# Patient Record
(demographics unavailable — no encounter records)

---

## 2025-03-07 NOTE — PHYSICAL EXAM
[No Acute Distress] : no acute distress [Well Nourished] : well nourished [Well-Appearing] : well-appearing [Normal Sclera/Conjunctiva] : normal sclera/conjunctiva [PERRL] : pupils equal round and reactive to light [Normal Outer Ear/Nose] : the outer ears and nose were normal in appearance [Normal Oropharynx] : the oropharynx was normal [No Lymphadenopathy] : no lymphadenopathy [Supple] : supple [No Respiratory Distress] : no respiratory distress  [No Accessory Muscle Use] : no accessory muscle use [Clear to Auscultation] : lungs were clear to auscultation bilaterally [Normal Rate] : normal rate  [Regular Rhythm] : with a regular rhythm [Normal S1, S2] : normal S1 and S2 [Soft] : abdomen soft [Non Tender] : non-tender [Normal Bowel Sounds] : normal bowel sounds [No Joint Swelling] : no joint swelling [Grossly Normal Strength/Tone] : grossly normal strength/tone [No Rash] : no rash [Coordination Grossly Intact] : coordination grossly intact [No Focal Deficits] : no focal deficits [Normal Gait] : normal gait [Deep Tendon Reflexes (DTR)] : deep tendon reflexes were 2+ and symmetric [Speech Grossly Normal] : speech grossly normal [Normal Mood] : the mood was normal

## 2025-03-07 NOTE — HISTORY OF PRESENT ILLNESS
[FreeTextEntry1] : physical [de-identified] : MS. MILTON IS A PLEASANT 39 YO PRESENTING FOR YEARLY PHYSICAL.  IS DUE TO SEE GYN.  SAW BREAST SURGERY AND HAD BENIGN BREAST BIOPSY.  IS ALSO HERE FOR CHORNIC CARE FOLLOW-UP.  NOTES THAT SHE WENT TO [facility] ER LAST WEEK.  WASN'T FEELING WELL AND HAD EVALUATION INCLUDING EKG AND LAB WORK.  WAS DIAGNOSED WITH HYPERGLYCEMIA.  HAS APPOINTMENT WITH NEW ENDOCRINOLOGIST NEXT WEDNESDAY.  HASN'T BEEN FOLLOWING UP WITH PRIOR ENDOCRINOLOGIST DR. AHUMADA OR TAKING DIABETES MEDICATION.  ALSO NOT TAKING LIPITOR FOR CHORNIC.  RAN OUT OF DIABETES MEDICATION IN JANUARY.  PRESCRIBED INSULIN TO TAKE BY ER.  DUE FOR DIABETIC EYE EXAM.  DIET HAS GOTTEN BETTER BUT NOT ROUTINELY EXERCISING. HISTORY OF LOW B12 AND VITAMIN D.  NOT ON SUPPLEMENTS. LMP: 2 WEEKS AGO BREAST SURGERY: DR. KELLY GI: DR. VALDEZ ENDOCRINOLOGY: DR. AHUMADA GYN: REQUESTING NAME OF NEW PROVIDER

## 2025-03-07 NOTE — HEALTH RISK ASSESSMENT
[Little interest or pleasure doing things] : 1) Little interest or pleasure doing things [Feeling down, depressed, or hopeless] : 2) Feeling down, depressed, or hopeless [0] : 2) Feeling down, depressed, or hopeless: Not at all (0) [PHQ-2 Negative - No further assessment needed] : PHQ-2 Negative - No further assessment needed [Good] : ~his/her~  mood as  good [Intercurrent ED visits] : went to ED [No] : In the past 12 months have you used drugs other than those required for medical reasons? No [Never] : Never [NO] : No [HIV Test offered] : HIV Test offered [Hepatitis C test offered] : Hepatitis C test offered [None] : None [With Family] : lives with family [# of Members in Household ___] :  household currently consist of [unfilled] member(s) [Employed] : employed [Graduate School] : graduate school [# Of Children ___] : has [unfilled] children [Feels Safe at Home] : Feels safe at home [Reports normal functional visual acuity (ie: able to read med bottle)] : Reports normal functional visual acuity [Smoke Detector] : smoke detector [Carbon Monoxide Detector] : carbon monoxide detector [Safety elements used in home] : safety elements used in home [Seat Belt] :  uses seat belt [Sunscreen] : uses sunscreen [de-identified] : SEE HPI [de-identified] : NOT ROUTINELY EXERCISING [de-identified] : DIET HAS "GOTTEN BETTER".  EATING HOME MORE [[id number]] : 0 [Change in mental status noted] : No change in mental status noted [Sexually Active] : not sexually active [High Risk Behavior] : no high risk behavior [Reports changes in hearing] : Reports no changes in hearing [Reports changes in vision] : Reports no changes in vision [Reports changes in dental health] : Reports no changes in dental health [Travel to Developing Areas] : does not  travel to developing areas [TB Exposure] : is not being exposed to tuberculosis [Caregiver Concerns] : does not have caregiver concerns [MammogramDate] : 11/24 [MammogramComments] : [facility] [PapSmearDate] : 2017 [de-identified] : GLASSES FOR DISTANCE

## 2025-03-07 NOTE — PLAN
[FreeTextEntry1] : GYN; CONTACT INFORMATION PROVIDED VISION SCREENING SAFETY / HEALTHY HABITS REVIEWED HEALTHY DIET AND LIFESTYLE MODIFICATIONS VACCINATIONS DISCUSSED: COVID, FLU, PNEUMONIA CHECK ROUTINE LAB WORK FOLLOW-UP ALL SPECIALISTS AS DIRECTED  COUNSELLED ON HEALTHY DIET AND LIFESTYLE MODIFICATIONS HEALTHY WEIGHT LOSS  OPHTHALMOLOGY FOR DIABETIC EYE EXAM TO TAKE MEDICATOIN PRESCRIBED BY ER KEEP UPCOMING APPOINTMENT WITH ENDOCRINOLOGY WILL GET UPDATED LAB WORK INCLUDING LIPIDS - FURTHER RECOMMENDATIONS PENDING RESULTS; NOT TAKING STATIN PREVIOUSLY PRESCRIBED WILL GET UPDATED B12 AND VITAMIN D LEVELS  CALL WITH ANY QUESTIONS, CONCERNS OR CHANGES

## 2025-03-07 NOTE — HISTORY OF PRESENT ILLNESS
[FreeTextEntry1] : physical [de-identified] : MS. MILTON IS A PLEASANT 39 YO PRESENTING FOR YEARLY PHYSICAL.  IS DUE TO SEE GYN.  SAW BREAST SURGERY AND HAD BENIGN BREAST BIOPSY.  IS ALSO HERE FOR CHORNIC CARE FOLLOW-UP.  NOTES THAT SHE WENT TO [facility] ER LAST WEEK.  WASN'T FEELING WELL AND HAD EVALUATION INCLUDING EKG AND LAB WORK.  WAS DIAGNOSED WITH HYPERGLYCEMIA.  HAS APPOINTMENT WITH NEW ENDOCRINOLOGIST NEXT WEDNESDAY.  HASN'T BEEN FOLLOWING UP WITH PRIOR ENDOCRINOLOGIST DR. AHUMADA OR TAKING DIABETES MEDICATION.  ALSO NOT TAKING LIPITOR FOR CHORNIC.  RAN OUT OF DIABETES MEDICATION IN JANUARY.  PRESCRIBED INSULIN TO TAKE BY ER.  DUE FOR DIABETIC EYE EXAM.  DIET HAS GOTTEN BETTER BUT NOT ROUTINELY EXERCISING. HISTORY OF LOW B12 AND VITAMIN D.  NOT ON SUPPLEMENTS. LMP: 2 WEEKS AGO BREAST SURGERY: DR. KELLY GI: DR. VALDEZ ENDOCRINOLOGY: DR. AHUMADA GYN: REQUESTING NAME OF NEW PROVIDER

## 2025-03-07 NOTE — HEALTH RISK ASSESSMENT
[Little interest or pleasure doing things] : 1) Little interest or pleasure doing things [Feeling down, depressed, or hopeless] : 2) Feeling down, depressed, or hopeless [0] : 2) Feeling down, depressed, or hopeless: Not at all (0) [PHQ-2 Negative - No further assessment needed] : PHQ-2 Negative - No further assessment needed [Good] : ~his/her~  mood as  good [Intercurrent ED visits] : went to ED [No] : In the past 12 months have you used drugs other than those required for medical reasons? No [Never] : Never [NO] : No [HIV Test offered] : HIV Test offered [Hepatitis C test offered] : Hepatitis C test offered [None] : None [With Family] : lives with family [# of Members in Household ___] :  household currently consist of [unfilled] member(s) [Employed] : employed [Graduate School] : graduate school [# Of Children ___] : has [unfilled] children [Feels Safe at Home] : Feels safe at home [Reports normal functional visual acuity (ie: able to read med bottle)] : Reports normal functional visual acuity [Smoke Detector] : smoke detector [Carbon Monoxide Detector] : carbon monoxide detector [Safety elements used in home] : safety elements used in home [Seat Belt] :  uses seat belt [Sunscreen] : uses sunscreen [de-identified] : SEE HPI [de-identified] : NOT ROUTINELY EXERCISING [de-identified] : DIET HAS "GOTTEN BETTER".  EATING HOME MORE [[id number]] : 0 [Change in mental status noted] : No change in mental status noted [Sexually Active] : not sexually active [High Risk Behavior] : no high risk behavior [Reports changes in hearing] : Reports no changes in hearing [Reports changes in vision] : Reports no changes in vision [Reports changes in dental health] : Reports no changes in dental health [Travel to Developing Areas] : does not  travel to developing areas [TB Exposure] : is not being exposed to tuberculosis [Caregiver Concerns] : does not have caregiver concerns [MammogramDate] : 11/24 [MammogramComments] : [facility] [PapSmearDate] : 2017 [de-identified] : GLASSES FOR DISTANCE

## 2025-03-07 NOTE — PLAN
Adult [FreeTextEntry1] : GYN; CONTACT INFORMATION PROVIDED VISION SCREENING SAFETY / HEALTHY HABITS REVIEWED HEALTHY DIET AND LIFESTYLE MODIFICATIONS VACCINATIONS DISCUSSED: COVID, FLU, PNEUMONIA CHECK ROUTINE LAB WORK FOLLOW-UP ALL SPECIALISTS AS DIRECTED  COUNSELLED ON HEALTHY DIET AND LIFESTYLE MODIFICATIONS HEALTHY WEIGHT LOSS  OPHTHALMOLOGY FOR DIABETIC EYE EXAM TO TAKE MEDICATOIN PRESCRIBED BY ER KEEP UPCOMING APPOINTMENT WITH ENDOCRINOLOGY WILL GET UPDATED LAB WORK INCLUDING LIPIDS - FURTHER RECOMMENDATIONS PENDING RESULTS; NOT TAKING STATIN PREVIOUSLY PRESCRIBED WILL GET UPDATED B12 AND VITAMIN D LEVELS  CALL WITH ANY QUESTIONS, CONCERNS OR CHANGES

## 2025-04-07 NOTE — HISTORY OF PRESENT ILLNESS
[de-identified] : SAW NEW ENDOCRINOLOGIST.  BASAGLAR WAS INCREASED TO 18 UNITS AND NOVOLOG WAS INCREASED TO 8 UNITS.  NO TYLENOL OR ALOCHOL PRIOR TO LAB WORK